# Patient Record
Sex: FEMALE | Race: BLACK OR AFRICAN AMERICAN
[De-identification: names, ages, dates, MRNs, and addresses within clinical notes are randomized per-mention and may not be internally consistent; named-entity substitution may affect disease eponyms.]

---

## 2018-05-11 ENCOUNTER — HOSPITAL ENCOUNTER (OUTPATIENT)
Dept: HOSPITAL 62 - LC | Age: 33
Discharge: HOME | End: 2018-05-11
Attending: OBSTETRICS & GYNECOLOGY
Payer: MEDICAID

## 2018-05-11 DIAGNOSIS — Z3A.34: ICD-10-CM

## 2018-05-11 DIAGNOSIS — O16.3: Primary | ICD-10-CM

## 2018-05-11 LAB
ADD MANUAL DIFF: NO
ALBUMIN SERPL-MCNC: 3.5 G/DL (ref 3.5–5)
ALP SERPL-CCNC: 104 U/L (ref 38–126)
ALT SERPL-CCNC: 21 U/L (ref 9–52)
ANION GAP SERPL CALC-SCNC: 13 MMOL/L (ref 5–19)
APPEARANCE UR: (no result)
APTT PPP: YELLOW S
AST SERPL-CCNC: 13 U/L (ref 14–36)
BASOPHILS # BLD AUTO: 0 10^3/UL (ref 0–0.2)
BASOPHILS NFR BLD AUTO: 0.4 % (ref 0–2)
BILIRUB DIRECT SERPL-MCNC: 0.2 MG/DL (ref 0–0.4)
BILIRUB SERPL-MCNC: 0.2 MG/DL (ref 0.2–1.3)
BILIRUB UR QL STRIP: NEGATIVE
BUN SERPL-MCNC: 5 MG/DL (ref 7–20)
CALCIUM: 9.7 MG/DL (ref 8.4–10.2)
CHLORIDE SERPL-SCNC: 106 MMOL/L (ref 98–107)
CO2 SERPL-SCNC: 23 MMOL/L (ref 22–30)
CREAT UR-MCNC: 67.4 MG/DL (ref 16–327)
EOSINOPHIL # BLD AUTO: 0.1 10^3/UL (ref 0–0.6)
EOSINOPHIL NFR BLD AUTO: 1.3 % (ref 0–6)
ERYTHROCYTE [DISTWIDTH] IN BLOOD BY AUTOMATED COUNT: 14.9 % (ref 11.5–14)
GLUCOSE SERPL-MCNC: 101 MG/DL (ref 75–110)
GLUCOSE UR STRIP-MCNC: NEGATIVE MG/DL
HCT VFR BLD CALC: 33.2 % (ref 36–47)
HGB BLD-MCNC: 10.9 G/DL (ref 12–15.5)
KETONES UR STRIP-MCNC: (no result) MG/DL
LDH1 SERPL-CCNC: 268 U/L (ref 313–618)
LYMPHOCYTES # BLD AUTO: 1.8 10^3/UL (ref 0.5–4.7)
LYMPHOCYTES NFR BLD AUTO: 20 % (ref 13–45)
MCH RBC QN AUTO: 26.6 PG (ref 27–33.4)
MCHC RBC AUTO-ENTMCNC: 33 G/DL (ref 32–36)
MCV RBC AUTO: 81 FL (ref 80–97)
MONOCYTES # BLD AUTO: 0.5 10^3/UL (ref 0.1–1.4)
MONOCYTES NFR BLD AUTO: 5.3 % (ref 3–13)
NEUTROPHILS # BLD AUTO: 6.5 10^3/UL (ref 1.7–8.2)
NEUTS SEG NFR BLD AUTO: 73 % (ref 42–78)
NITRITE UR QL STRIP: NEGATIVE
PH UR STRIP: 6 [PH] (ref 5–9)
PLATELET # BLD: 372 10^3/UL (ref 150–450)
POTASSIUM SERPL-SCNC: 4.2 MMOL/L (ref 3.6–5)
PROT SERPL-MCNC: 6.6 G/DL (ref 6.3–8.2)
PROT UR STRIP-MCNC: 16 MG/DL (ref ?–12)
PROT UR STRIP-MCNC: NEGATIVE MG/DL
RBC # BLD AUTO: 4.11 10^6/UL (ref 3.72–5.28)
SODIUM SERPL-SCNC: 141.5 MMOL/L (ref 137–145)
SP GR UR STRIP: 1.01
TOTAL CELLS COUNTED % (AUTO): 100 %
UR PRO/CREAT RATIO RESULT: 0.2 MG/MG (ref 0–0.2)
URATE SERPL-MCNC: 5.2 MG/DL (ref 2.5–7)
UROBILINOGEN UR-MCNC: NEGATIVE MG/DL (ref ?–2)
WBC # BLD AUTO: 8.9 10^3/UL (ref 4–10.5)

## 2018-05-11 PROCEDURE — 80053 COMPREHEN METABOLIC PANEL: CPT

## 2018-05-11 PROCEDURE — 76815 OB US LIMITED FETUS(S): CPT

## 2018-05-11 PROCEDURE — 36415 COLL VENOUS BLD VENIPUNCTURE: CPT

## 2018-05-11 PROCEDURE — 59025 FETAL NON-STRESS TEST: CPT

## 2018-05-11 PROCEDURE — 84156 ASSAY OF PROTEIN URINE: CPT

## 2018-05-11 PROCEDURE — 83615 LACTATE (LD) (LDH) ENZYME: CPT

## 2018-05-11 PROCEDURE — 81005 URINALYSIS: CPT

## 2018-05-11 PROCEDURE — 82570 ASSAY OF URINE CREATININE: CPT

## 2018-05-11 PROCEDURE — 84550 ASSAY OF BLOOD/URIC ACID: CPT

## 2018-05-11 PROCEDURE — 85025 COMPLETE CBC W/AUTO DIFF WBC: CPT

## 2018-05-11 PROCEDURE — 4A1HXCZ MONITORING OF PRODUCTS OF CONCEPTION, CARDIAC RATE, EXTERNAL APPROACH: ICD-10-PCS | Performed by: OBSTETRICS & GYNECOLOGY

## 2018-05-11 NOTE — L&D PROGRESS NOTES
=================================================================

PROGRESS NOTES

=================================================================

Datetime Report Generated by CPN: 05/11/2018 14:25

   

   

=================================================================

PROGRESS NOTE

=================================================================

   

Impression Other:  Hypertension

Plan Other:  Home with followup Monday

Vital Signs :  Reviewed

Comment:  Pt with some mild range hypertension.  will begin labetolol

   and check 24 hour urine protein.  Followup MOn.

   

=================================================================

FETUS A

=================================================================

   

FHR - Baseline:  140

Variability:  Moderate 6-25bpm

FHR Category:  Category I

:  35.0

   

=================================================================

SIGNATURE

=================================================================

   

SIGNATURE:  [id number]

Signature:  Electronically signed by Chilo Jean MD ([email or web address]) on

   5/11/2018 at 14:25  with User ID: [email or web address]

## 2018-05-11 NOTE — RADIOLOGY REPORT (SQ)
EXAM DESCRIPTION:  U/S OB LIMITED



COMPLETED DATE/TIME:  5/11/2018 11:33 am



REASON FOR STUDY:  nayana and growth- limited care



COMPARISON:  None.



TECHNIQUE:  Limited transvaginal grayscale ultrasound for evaluation of specific requested obstetrica
l parameters.



LIMITATIONS:  None.



FINDINGS:  Fetal heart rate 157.

Posterior grade 1 placenta.  Vertex presentation.

NAYANA 13.0.

Gestational age 35 weeks 1 day.



IMPRESSION:  LIMITED OBSTETRICAL ULTRASOUND WITH MEASURED PARAMETERS DELINEATED ABOVE.

Trimester of pregnancy: Third trimester - 28 weeks to delivery.



TECHNICAL DOCUMENTATION:  JOB ID:  [id number]

 2011 [employer]- All Rights Reserved



Reading location - IP/workstation name: Unknown

## 2018-06-13 ENCOUNTER — HOSPITAL ENCOUNTER (INPATIENT)
Dept: HOSPITAL 62 - LC | Age: 33
LOS: 2 days | Discharge: HOME | End: 2018-06-15
Attending: OBSTETRICS & GYNECOLOGY | Admitting: OBSTETRICS & GYNECOLOGY
Payer: MEDICAID

## 2018-06-13 DIAGNOSIS — F14.90: ICD-10-CM

## 2018-06-13 DIAGNOSIS — F20.9: ICD-10-CM

## 2018-06-13 DIAGNOSIS — O34.211: ICD-10-CM

## 2018-06-13 DIAGNOSIS — F12.90: ICD-10-CM

## 2018-06-13 DIAGNOSIS — Z3A.34: ICD-10-CM

## 2018-06-13 DIAGNOSIS — E66.01: ICD-10-CM

## 2018-06-13 DIAGNOSIS — N85.8: ICD-10-CM

## 2018-06-13 DIAGNOSIS — F31.9: ICD-10-CM

## 2018-06-13 LAB
ADD MANUAL DIFF: NO
ALBUMIN SERPL-MCNC: 3.4 G/DL (ref 3.5–5)
ALP SERPL-CCNC: 145 U/L (ref 38–126)
ALT SERPL-CCNC: 26 U/L (ref 9–52)
ANION GAP SERPL CALC-SCNC: 12 MMOL/L (ref 5–19)
APPEARANCE UR: (no result)
APTT BLD: 26.7 SEC (ref 23.5–35.8)
APTT PPP: YELLOW S
AST SERPL-CCNC: 21 U/L (ref 14–36)
BACTERIA (WET MOUNT): (no result)
BARBITURATES UR QL SCN: NEGATIVE
BASOPHILS # BLD AUTO: 0.1 10^3/UL (ref 0–0.2)
BASOPHILS NFR BLD AUTO: 0.5 % (ref 0–2)
BILIRUB DIRECT SERPL-MCNC: 0.2 MG/DL (ref 0–0.4)
BILIRUB SERPL-MCNC: 0.2 MG/DL (ref 0.2–1.3)
BILIRUB UR QL STRIP: NEGATIVE
BUN SERPL-MCNC: 18 MG/DL (ref 7–20)
CALCIUM: 10 MG/DL (ref 8.4–10.2)
CHLAM PCR: NOT DETECTED
CHLORIDE SERPL-SCNC: 111 MMOL/L (ref 98–107)
CO2 SERPL-SCNC: 19 MMOL/L (ref 22–30)
CREAT UR-MCNC: 172 MG/DL (ref 16–327)
EOSINOPHIL # BLD AUTO: 0.1 10^3/UL (ref 0–0.6)
EOSINOPHIL NFR BLD AUTO: 0.8 % (ref 0–6)
EPITHELIALS (WET MOUNT): (no result)
ERYTHROCYTE [DISTWIDTH] IN BLOOD BY AUTOMATED COUNT: 15.5 % (ref 11.5–14)
GLUCOSE SERPL-MCNC: 101 MG/DL (ref 75–110)
GLUCOSE UR STRIP-MCNC: NEGATIVE MG/DL
GON PCR: NOT DETECTED
HCT VFR BLD CALC: 35.1 % (ref 36–47)
HGB BLD-MCNC: 11.6 G/DL (ref 12–15.5)
INR PPP: 0.94
KETONES UR STRIP-MCNC: NEGATIVE MG/DL
LDH1 SERPL-CCNC: 328 U/L (ref 313–618)
LYMPHOCYTES # BLD AUTO: 2.2 10^3/UL (ref 0.5–4.7)
LYMPHOCYTES NFR BLD AUTO: 20.7 % (ref 13–45)
MCH RBC QN AUTO: 27.1 PG (ref 27–33.4)
MCHC RBC AUTO-ENTMCNC: 32.9 G/DL (ref 32–36)
MCV RBC AUTO: 83 FL (ref 80–97)
METHADONE UR QL SCN: NEGATIVE
MONOCYTES # BLD AUTO: 0.6 10^3/UL (ref 0.1–1.4)
MONOCYTES NFR BLD AUTO: 6.2 % (ref 3–13)
NEUTROPHILS # BLD AUTO: 7.5 10^3/UL (ref 1.7–8.2)
NEUTS SEG NFR BLD AUTO: 71.8 % (ref 42–78)
NITRITE UR QL STRIP: NEGATIVE
PCP UR QL SCN: NEGATIVE
PH UR STRIP: 5 [PH] (ref 5–9)
PLATELET # BLD: 336 10^3/UL (ref 150–450)
POTASSIUM SERPL-SCNC: 4.8 MMOL/L (ref 3.6–5)
PROT SERPL-MCNC: 6.7 G/DL (ref 6.3–8.2)
PROT UR STRIP-MCNC: 30 MG/DL
PROT UR STRIP-MCNC: 36.7 MG/DL (ref ?–12)
PROTHROMBIN TIME: 13.1 SEC (ref 11.4–15.4)
RBC # BLD AUTO: 4.26 10^6/UL (ref 3.72–5.28)
RBCS (WET MOUNT): (no result)
RUBELLA INTERPRETATION: POSITIVE
RUBV IGG SER-ACNC: 17.4 IU/ML
SODIUM SERPL-SCNC: 141.8 MMOL/L (ref 137–145)
SP GR UR STRIP: 1.02
T.VAGINALIS (WET MOUNT): (no result)
TOTAL CELLS COUNTED % (AUTO): 100 %
UR PRO/CREAT RATIO RESULT: 0.2 MG/MG (ref 0–0.2)
URATE SERPL-MCNC: 9.2 MG/DL (ref 2.5–7)
URINE AMPHETAMINES SCREEN: NEGATIVE
URINE BENZODIAZEPINES SCREEN: NEGATIVE
URINE COCAINE SCREEN: (no result)
URINE MARIJUANA (THC) SCREEN: (no result)
UROBILINOGEN UR-MCNC: NEGATIVE MG/DL (ref ?–2)
WBC # BLD AUTO: 10.4 10^3/UL (ref 4–10.5)
WBCS (WET MOUNT): (no result)
YEAST (WET MOUNT): (no result)

## 2018-06-13 PROCEDURE — 86803 HEPATITIS C AB TEST: CPT

## 2018-06-13 PROCEDURE — 87491 CHLMYD TRACH DNA AMP PROBE: CPT

## 2018-06-13 PROCEDURE — 80353 DRUG SCREENING COCAINE: CPT

## 2018-06-13 PROCEDURE — 86701 HIV-1ANTIBODY: CPT

## 2018-06-13 PROCEDURE — 94760 N-INVAS EAR/PLS OXIMETRY 1: CPT

## 2018-06-13 PROCEDURE — 87086 URINE CULTURE/COLONY COUNT: CPT

## 2018-06-13 PROCEDURE — 86762 RUBELLA ANTIBODY: CPT

## 2018-06-13 PROCEDURE — 86920 COMPATIBILITY TEST SPIN: CPT

## 2018-06-13 PROCEDURE — 83036 HEMOGLOBIN GLYCOSYLATED A1C: CPT

## 2018-06-13 PROCEDURE — 96372 THER/PROPH/DIAG INJ SC/IM: CPT

## 2018-06-13 PROCEDURE — 88307 TISSUE EXAM BY PATHOLOGIST: CPT

## 2018-06-13 PROCEDURE — 85025 COMPLETE CBC W/AUTO DIFF WBC: CPT

## 2018-06-13 PROCEDURE — 84156 ASSAY OF PROTEIN URINE: CPT

## 2018-06-13 PROCEDURE — 87591 N.GONORRHOEAE DNA AMP PROB: CPT

## 2018-06-13 PROCEDURE — 84443 ASSAY THYROID STIM HORMONE: CPT

## 2018-06-13 PROCEDURE — 85730 THROMBOPLASTIN TIME PARTIAL: CPT

## 2018-06-13 PROCEDURE — 83615 LACTATE (LD) (LDH) ENZYME: CPT

## 2018-06-13 PROCEDURE — 86901 BLOOD TYPING SEROLOGIC RH(D): CPT

## 2018-06-13 PROCEDURE — 86804 HEP C AB TEST CONFIRM: CPT

## 2018-06-13 PROCEDURE — 85610 PROTHROMBIN TIME: CPT

## 2018-06-13 PROCEDURE — 85027 COMPLETE CBC AUTOMATED: CPT

## 2018-06-13 PROCEDURE — 80307 DRUG TEST PRSMV CHEM ANLYZR: CPT

## 2018-06-13 PROCEDURE — 86850 RBC ANTIBODY SCREEN: CPT

## 2018-06-13 PROCEDURE — 36415 COLL VENOUS BLD VENIPUNCTURE: CPT

## 2018-06-13 PROCEDURE — 87088 URINE BACTERIA CULTURE: CPT

## 2018-06-13 PROCEDURE — G0480 DRUG TEST DEF 1-7 CLASSES: HCPCS

## 2018-06-13 PROCEDURE — 87081 CULTURE SCREEN ONLY: CPT

## 2018-06-13 PROCEDURE — 80053 COMPREHEN METABOLIC PANEL: CPT

## 2018-06-13 PROCEDURE — 81005 URINALYSIS: CPT

## 2018-06-13 PROCEDURE — 82570 ASSAY OF URINE CREATININE: CPT

## 2018-06-13 PROCEDURE — 86900 BLOOD TYPING SEROLOGIC ABO: CPT

## 2018-06-13 PROCEDURE — 87210 SMEAR WET MOUNT SALINE/INK: CPT

## 2018-06-13 PROCEDURE — 87340 HEPATITIS B SURFACE AG IA: CPT

## 2018-06-13 PROCEDURE — 90715 TDAP VACCINE 7 YRS/> IM: CPT

## 2018-06-13 PROCEDURE — 84550 ASSAY OF BLOOD/URIC ACID: CPT

## 2018-06-13 PROCEDURE — 99465 NB RESUSCITATION: CPT

## 2018-06-13 PROCEDURE — 86592 SYPHILIS TEST NON-TREP QUAL: CPT

## 2018-06-13 RX ADMIN — DOCUSATE SODIUM SCH MG: 100 CAPSULE, LIQUID FILLED ORAL at 18:58

## 2018-06-13 RX ADMIN — IBUPROFEN SCH MG: 800 TABLET, FILM COATED ORAL at 22:37

## 2018-06-13 RX ADMIN — LABETALOL HYDROCHLORIDE SCH MG: 200 TABLET, FILM COATED ORAL at 22:30

## 2018-06-13 RX ADMIN — FAMOTIDINE SCH MG: 20 TABLET, FILM COATED ORAL at 22:37

## 2018-06-13 RX ADMIN — FERROUS SULFATE TAB 325 MG (65 MG ELEMENTAL FE) SCH MG: 325 (65 FE) TAB at 18:58

## 2018-06-13 NOTE — ADMISSION PHYSICAL
=================================================================



=================================================================

Datetime Report Generated by CPN: 2018 16:24

   

   

=================================================================

CURRENT ADMISSION

=================================================================

   

Prenatal Hx Assessment:  No Prenatal Care except for one visit with

   nurse at [facility] and one at [facility]

Chief Complaint:  Uterine Contractions

Indication for Induction:  Not Applicable

Admit Impression :  Term, Intrauterine Pregnancy

Admit Plan:  Admit to Unit; Initiate Labor Protocol; Initiate 

   Protocol

   

=================================================================

ALLERGIES

=================================================================

   

Medication Allergies:  No

Medication Allergies:  No Known Allergies (2018)

Latex:  No Latex Allergies

   

=================================================================

OBSTETRICAL HISTORY

=================================================================

   

EDC:  2018 00:00

:  6

Para:  2

Term:  2

:  0

SAB:  3

IAB:  0

Ectopic:  0

Livin

Cesareans:  1

VBACs:  0

Multiple Births:  0

Gestational Diabetes:  No

Rh Sensitization:  No

Incompetent Cervix:  No

FORTINO:  No

Infertility:  No

ART Treatment:  No

Uterine Anomaly:  No

IUGR:  No

Hx Previous C/S:  No

Macrosomia:  No

Hx Loss/Stillborn:  No

PIH:  Yes

Hx  Death:  No

Placenta Previa/Abruption:  No

Depression/PP Depression:  Yes

PTL/PROM:  No

Post Partum Hemorrhage:  No

Current Pregnancy Procedures:  Ultrasound; NST

Obstetrical History Comments:  G1:  full term vaginal

   G2:  34 weeks, C/S, pre-e

   G3: current

   

=================================================================

***SEE PRENATAL RECORDS***

=================================================================

   

Alcohol:  No

Marijuana :  Yes

Cocaine:  Yes

Last Used:  18

Cocaine Comments:  Pt reports no but tested positive on UDS 18

Other Illicit Drugs:  No

Cigarettes:  Never Smoker. [id number]

   

=================================================================

MEDICAL HISTORY

=================================================================

   

Diabetes:  No

Blood Transfusion:  No

Pulmonary Disease (Asthma, TB):  No

Breast Disease:  No

Hypertension:  No

Gyn Surgery:  No

Heart Disease:  No

Hosp/Surgery:  Yes

Autoimmune Disorder:  No

Anesthetic Complications:  No

Kidney Disease:  No

Abnormal Pap Smear:  No

Neuro/Epilepsy:  No

Psychiatric Disorders:  Yes

Other Medical Diseases:  No

Hepatitis/Liver Disease:  No

Significant Family History:  No

Varicosities/Phlebitis:  No

Trauma/Violence :  No

Thyroid Dysfunction:  No

Medical History Comments:  C/SECTION- 

   

=================================================================

INFECTIOUS HISTORY

=================================================================

   

Gonorrhea:  No

Genital Herpes:  No

Chlamydia:  No

Tuberculosis:  No

Syphilis:  No

Hepatitis:  No

HIV/AIDS Exposure:  No

Rash or Viral Illness:  No

HPV:  No

   

=================================================================

PHYSICAL EXAM

=================================================================

   

General:  Normal

HEENT:  Deferred

Neurologic:  Deferred

Thyroid:  Deferred

Heart:  Normal

Lungs:  Normal

Breast:  Normal

Back:  Normal

Abdomen:  Normal

Genitourinary Exam:  Normal

Extremities:  Normal

DTRs:  Normal

Pelvic Type:  Adequate

Physical Exam Comments:  pelvis proven to 7lbs

Vital Signs:  Reviewed

Details Vital Signs:  severe range Dr. Bishop in room and managing pt.

   

=================================================================

VAGINAL EXAM

=================================================================

   

Dilatation:  5

Effacement:  70

Station:  -2

Contraction Comments:  2-3

   

=================================================================

MEMBRANES

=================================================================

   

Membranes:  Ruptured

Amniotic Fluid Color:  Clear

   

=================================================================

FETUS A

=================================================================

   

EGA:  34.0

Monitoring:  Internal Scalp Electrode

FHR- Baseline:  135

Variability:  Minimal - Undetectable to <=5bpm

Decelerations:  Early; Late; Variable

FHR Category:  Category II

FHR Comments:  pt. had cocaine and marijuana last night

Fetal Presentation:  Vertex

Admit Comment:  34yo  @ 38w5d per 26w2d sono. Pt. is B pos, with

   neg GC/Chlam on admission and all other labs pending. No prenatal

   care except for a one time consult at [facility] and a nurse visit at [facility].

   Pregnancy complicated by CHTN (on labetalol 200mg bid), morbid

   obesity (BMI 53.4  @ [facility] nurse visit), bipolar disorder,

   schizophrenia, anxiety, depression, cocaine and marijuana use

   multiple times in pregnancy including last night. Also with a Hgb

   A1c of 6.2 on admission. Hx of vaginal delivery in  and primary

    in 2016 for hypertensive crisis/FHR intolerance. Pt.

   desires to TOLAC. Presented to L_D with contractions and had

   cervical change and admitted per Dr. Bishop. Thought to be 

   at first due to LMP discrepancy. Records reviewed and PAWAN confirmed.

   Pt. now with epidural and 9cm. Dr. Bishop remains in unit and

   anesthesia aware of laboring TOLAC.

   

=================================================================

PLANS FOR LABOR AND DELIVERY

=================================================================

   

Labor and Delivery:  None

Pain Management:  Epidural

Feeding Preference:  Formula

Benefit of Breast Feed Discussed:  Yes

Circumcision:  N/A

   

=================================================================

INFORMED CONSENT

=================================================================

   

Assignment:  Zenaida Bishop MD

Signature:  Electronically signed by Ruth Lebron CNM on

   2018 at 16:22  with User ID: [email or web address]

:  Electronically signed by Ruth Lebron CNM on 2018 at

   16:22  with User ID: [email or web address]

## 2018-06-13 NOTE — NON STRESS TEST REPORT
=================================================================

Non Stress Test

=================================================================

Datetime Report Generated by CPN: 06/13/2018 11:32

   

   

=================================================================

DEMOGRAPHIC

=================================================================

   

EGA NST:  34.0

   

=================================================================

INDICATION

=================================================================

   

Indication for Study:  Ordered by Provider

   

=================================================================

VITAL SIGNS

=================================================================

   

Temperature - NST:  97.9

Pulse - NST:  90

RESP - NST:  20

NBPSYS NST:  146

NBPDIA NST:  81

   

=================================================================

MONITORING

=================================================================

   

Monitor Explained:  Monitor Explained; Test Explained; Patient

   Verbalized Understanding

Time on Monitor:  05/11/2018 12:40

   

=================================================================

NST INTERVENTIONS

=================================================================

   

NST Interventions:  PO Hydration

Physician Notified NST:  Dr Jean

BABY A:  [id number]

   

=================================================================

BABY A

=================================================================

   

Fetal Movement :  Present

Contraction Frequency :  occasional

Accelerations :  15X15

Decelerations :  None

Variability :  Moderate 6-25bpm

NST Review:  Meets Criteria for Reactive NST

NST Review and Verified By :  JESUSITA Escobar RNC

NST Results:  Reactive

   

=================================================================

NST REPORT

=================================================================

   

Report Trigger:  Send Report

## 2018-06-13 NOTE — DELIVERY SUMMARY
=================================================================

Del Sum A-C

=================================================================

Datetime Report Generated by CPN: 2018 20:28

   

   

=================================================================

DELIVERY PERSONNEL

=================================================================

   

DELIVERY PERSONNEL:  [id number]

Delivery Doctor::  Zenaida Bishop MD

Labor and Delivery Nurse::  Kayla Goldstein RN

Nursery Nurse::  Aure Fierro RN

Nursery Nurse::  Teresa Barragan RN

Scrub Tech/CNA:  Shey Martinez CNA II

   

=================================================================

MATERNAL INFORMATION

=================================================================

   

Delivery Anesthesia:  Epidural

Medications After Delivery:  Pitocin Bolus-Please Comment; Pitocin Drip

   20 Units/1000ml NSS

Maternal Complications:  Other

Complication Details:  LIMITED PNC; + COCAINE _ THC; PRE-ECLAMPSIA;

   PRIOR C/SECTION

Provider Comments:  FHR with repetitive deep variable decels.  Reviewed

   with patient AL and need to reduce anterior lip of cervix.  Pt

   inquired regarding a  section since she does not like

   cervical exams.  Reviewed with paitnet that need to deliver and

   would be much faster to deliver vaginally.  She wished to wait on

    and reviewed again with  need to proceed with

   delivery.    spoke with patient and patient agreed for

   vaginal exam and attempt at reduction of anterior lip.  Patient

   positioned and anterior lip of the cervix reduced.  She had recieved

   50mcg fentanyl apporx 45 min prior to delivery and then within 10

   minutes from delivery as she would not consent to cervical exam

   without medication.  VMI delivered in STEPHANIE presentation in one

   contraction after reducing the anterior lip of cervix.  Infant with

   spontaneous breath then to warmer for NRP and decreased respirations

   and tone.  Infant transported to NICU.  No perineal lacerations.  FF

   at U after delivery of placenta.  Mother stable upon provider

   leaving the room.

   

=================================================================

LABOR SUMMARY

=================================================================

   

EDC:  2018 00:00

No. Babies in Womb:  1

 Attempted:  Yes

Labor Anesthesia:  Epidural

   

=================================================================

LABOR INFORMATION

=================================================================

   

Reason for Induction:  Not Applicable

Onset of Labor:  2018 12:38

Complete Dilatation:  2018 17:16

Oxytocin:  N/A

Group B Beta Strep:  UNKNOWN

Antibiotics # of Doses:  1

Antibiotics Time of Last Dose:  1708

Name of Antibiotic Given:  PENICILLIN G

Steroids Given:  Partial Course; < 24 Hours before Delivery

Reason Steroids Not Administered:  Fetal Indication

   

=================================================================

MEMBRANES

=================================================================

   

Membranes Rupture Method:  Spontaneous

Rupture of Membranes:  2018 11:12

Length of Rupture (hr):  6.12

Amniotic Fluid Color:  Clear

Amniotic Fluid Amount:  Small

Amniotic Fluid Odor:  Foul

   

=================================================================

STAGES OF LABOR

=================================================================

   

Stage 1 hr:  4

Stage 1 min:  38

Stage 2 hr:  0

Stage 2 min:  3

Stage 3 hr:  0

Stage 3 min:  2

Total Time in Labor hr:  4

Total Time in Labor min:  43

   

=================================================================

VAGINAL DELIVERY

=================================================================

   

Episiotomy:  None

Laceration #1:  None

Laceration Extension #1:  N/A

Laceration Repair:  Not Applicable

Sponge Count Correct:  N/A

Sharps Count Correct:  N/A

   

=================================================================

CSECTION DELIVERY

=================================================================

   

Primary Indication:  N/A

Secondary Indication:  N/A

CSection Incidence:  N/A

Labor:  N/A

Elective:  N/A

CSection Incision:  N/A

   

=================================================================

BABY A INFORMATION

=================================================================

   

Infant Delivery Date/Time:  2018 17:19

Method of Delivery:  Vaginal

Born in Route :  No

:  N/A

Forceps:  N/A

Vacuum Extraction:  N/A

Shoulder Dystocia :  No

   

=================================================================

PRESENTATION/POSITION BABY A

=================================================================

   

Presentation:  Cephalic

Cephalic Presentation:  Vertex

Vertex Position:  Left Occipital Anterior

   

=================================================================

PLACENTA INFORMATION BABY A

=================================================================

   

Placenta Delivery Time :  2018 17:21

Placenta Method of Delivery:  Spontaneous

Placenta Status:  Delivered

   

=================================================================

APGAR SCORES BABY A

=================================================================

   

Heart Rate 1 min:  Slow, Below 100 bpm

Resp Effort 1 min:  Absent

Reflex Irritability 1 min:  No Response

Muscle Tone 1 min:  Flaccid

Color 1 min:  Blue/Pale

Resuscitation Effort 1 min:  Tactile Stimulation; PPV/NCPAP

APGAR SCORE 1 MIN:  1

Heart Rate 5 min:  >100 bpm

Resp Effort 5 min:  Slow, Irregular

Reflex Irritability 5 min:  No Response

Muscle Tone 5 min:  Flaccid

Color 5 min:  Blue/Pale

Resuscitation Effort 5 min:  PPV/NCPAP

APGAR SCORE 5 MIN:  3

Heart Rate 10 min:  >100 bpm

Resp Effort 10 min:  Slow, Irregular

Reflex Irritability 10 min:  Cough or Sneeze or Pulls Away

Muscle Tone 10 min:  Flaccid

Color 10 min:  Blue/Pale

Resuscitation Effort 10 min:  PPV/NCPAP

APGAR SCORE 10 MIN:  5

Heart Rate 15 min:  >100 bpm

Resp Effort 15 min:  Slow, Irregular

Reflex Irritability 15 min:  Cough or Sneeze or Pulls Away

Muscle Tone 15 min:  Some Flexion of Extremities

Color 15 min:  Body Pink, Extremities Blue

Resuscitation Effort 15 min:  Oxygen; PPV/NCPAP

APGAR SCORE 15 MIN:  7

   

=================================================================

INFANT INFORMATION BABY A

=================================================================

   

Gestational Age at Delivery:  34.3

Gestational Status:  Late - 34- 36.6 Weeks

Infant Outcome :  Liveborn

Infant Condition :  Critical

Infant Sex:  Male

   

=================================================================

IDENTIFICATION BABY A

=================================================================

   

Infant Verification Date/Time:  2018 17:49

ID Band Number:  [id number]

Mother's Name Verified:  Yes

Infant Medical Record Number:  [id number]

RN Verifying Infant:  J.Field, RN

Additional Verifying Personnel:  ALTARadha, CNA/US

   

=================================================================

WEIGHT/LENGTH BABY A

=================================================================

   

Infant Birthweight (gm):  3970

Infant Weight (lb):  8

Infant Weight (oz):  12

Infant Length (in):  21.00

Infant Length (cm):  53.34

   

=================================================================

CORD INFORMATION BABY A

=================================================================

   

No. Cord Vessels:  3

Nuchal Cord :  N/A

Cord Blood Taken:  Yes-For Storage (Mom's Blood type +)

   

=================================================================

ASSESSMENT BABY A

=================================================================

   

Infant Complications:  Other

Infant Complications- Other:  LIMITED PNC; 34.3 WEEK IUP

Skin to Skin:  No

   

=================================================================

BABY B INFORMATION

=================================================================

   

 :  N/A

   

=================================================================

SIGNATURES

=================================================================

   

Signature:  Electronically signed by MD WAYNE Vallecillo) on

   2018 at 19:03  with User ID: [email or web address]

## 2018-06-13 NOTE — WARNING SIGNS IN BABIES
=================================================================

VOD Warning Signs

=================================================================

Datetime Report Generated by [facility]: 06/13/2018 18:23

   

VOD#608 -Warning Signs in Babies:  Needs to be viewed.    (05/11/2018

   11:12:Kayla Goldstein RN)

## 2018-06-14 LAB
ERYTHROCYTE [DISTWIDTH] IN BLOOD BY AUTOMATED COUNT: 15.4 % (ref 11.5–14)
HCT VFR BLD CALC: 32.5 % (ref 36–47)
HCV AB SER IA-ACNC: <0.1 S/CO RATIO (ref 0–0.9)
HGB BLD-MCNC: 10.5 G/DL (ref 12–15.5)
MCH RBC QN AUTO: 26.2 PG (ref 27–33.4)
MCHC RBC AUTO-ENTMCNC: 32.2 G/DL (ref 32–36)
MCV RBC AUTO: 81 FL (ref 80–97)
PLATELET # BLD: 298 10^3/UL (ref 150–450)
RBC # BLD AUTO: 3.99 10^6/UL (ref 3.72–5.28)
WBC # BLD AUTO: 17.5 10^3/UL (ref 4–10.5)

## 2018-06-14 RX ADMIN — SENNOSIDES, DOCUSATE SODIUM SCH EACH: 50; 8.6 TABLET, FILM COATED ORAL at 11:16

## 2018-06-14 RX ADMIN — ACETAMINOPHEN AND CODEINE PHOSPHATE PRN EACH: 300; 30 TABLET ORAL at 16:32

## 2018-06-14 RX ADMIN — LABETALOL HYDROCHLORIDE SCH MG: 200 TABLET, FILM COATED ORAL at 14:52

## 2018-06-14 RX ADMIN — DOCUSATE SODIUM SCH MG: 100 CAPSULE, LIQUID FILLED ORAL at 11:17

## 2018-06-14 RX ADMIN — FAMOTIDINE SCH MG: 20 TABLET, FILM COATED ORAL at 11:17

## 2018-06-14 RX ADMIN — DOCUSATE SODIUM SCH MG: 100 CAPSULE, LIQUID FILLED ORAL at 18:06

## 2018-06-14 RX ADMIN — FERROUS SULFATE TAB 325 MG (65 MG ELEMENTAL FE) SCH MG: 325 (65 FE) TAB at 18:06

## 2018-06-14 RX ADMIN — IBUPROFEN SCH MG: 800 TABLET, FILM COATED ORAL at 21:03

## 2018-06-14 RX ADMIN — IBUPROFEN SCH MG: 800 TABLET, FILM COATED ORAL at 11:18

## 2018-06-14 RX ADMIN — LABETALOL HYDROCHLORIDE SCH MG: 200 TABLET, FILM COATED ORAL at 21:04

## 2018-06-14 RX ADMIN — FERROUS SULFATE TAB 325 MG (65 MG ELEMENTAL FE) SCH MG: 325 (65 FE) TAB at 11:16

## 2018-06-14 RX ADMIN — IBUPROFEN SCH MG: 800 TABLET, FILM COATED ORAL at 14:52

## 2018-06-14 RX ADMIN — LABETALOL HYDROCHLORIDE SCH MG: 200 TABLET, FILM COATED ORAL at 08:14

## 2018-06-14 RX ADMIN — FAMOTIDINE SCH MG: 20 TABLET, FILM COATED ORAL at 21:03

## 2018-06-14 RX ADMIN — Medication SCH CAP: at 11:16

## 2018-06-14 NOTE — PDOC PROGRESS REPORT
Subjective-OB


Progress Note for:: 18


Subjective: 





s/p  day #1





denies concerns, states lochia is stable, pain well controlled, voiding without 

difficulty. 





Physical Exam (OB)


Vital Signs: 


 Intake & Output











 06/13/18 06/14/18 06/15/18





 06:59 06:59 06:59


 


Weight  152.6 kg 














- Lochia


Lochia Amount: Small 10-25 ml


Lochia Color: Rubra/Red





- Abdomen


Description: Soft, Round


Hernia Present: Yes


Fundal Description: Firm


Fundal Height: 1/u - 2/u





Objective-Diagnostic


Laboratory: 


 





 18 07:01 





 18 12:14 





 











  18





  11:43 12:14 12:14


 


WBC   10.4 


 


RBC   4.26 


 


Hgb   11.6 L 


 


Hct   35.1 L 


 


MCV   83 


 


MCH   27.1 


 


MCHC   32.9 


 


RDW   15.5 H 


 


Plt Count   336 


 


Seg Neutrophils %   71.8 


 


Lymphocytes %   20.7 


 


Monocytes %   6.2 


 


Eosinophils %   0.8 


 


Basophils %   0.5 


 


Absolute Neutrophils   7.5 


 


Absolute Lymphocytes   2.2 


 


Absolute Monocytes   0.6 


 


Absolute Eosinophils   0.1 


 


Absolute Basophils   0.1 


 


Sodium   


 


Potassium   


 


Chloride   


 


Carbon Dioxide   


 


Anion Gap   


 


BUN   


 


Creatinine   


 


Est GFR ( Amer)   


 


Est GFR (Non-Af Amer)   


 


Glucose   


 


Uric Acid   


 


Calcium   


 


Total Bilirubin   


 


AST   


 


ALT   


 


Alkaline Phosphatase   


 


Total Protein   


 


Albumin   


 


TSH    3.88


 


Urine Color  YELLOW  


 


Urine Appearance  CLOUDY  


 


Urine pH  5.0  


 


Ur Specific Gravity  1.020  


 


Urine Protein  30 H  


 


Urine Glucose (UA)  NEGATIVE  


 


Urine Ketones  NEGATIVE  


 


Urine Blood  NEGATIVE  


 


Urine Nitrite  NEGATIVE  


 


Ur Leukocyte Esterase  SMALL H  


 


Blood Type   


 


Antibody Screen   














  18





  12:14 12:14 07:01


 


WBC    17.5 H


 


RBC    3.99


 


Hgb    10.5 L


 


Hct    32.5 L


 


MCV    81


 


MCH    26.2 L


 


MCHC    32.2


 


RDW    15.4 H


 


Plt Count    298


 


Seg Neutrophils %   


 


Lymphocytes %   


 


Monocytes %   


 


Eosinophils %   


 


Basophils %   


 


Absolute Neutrophils   


 


Absolute Lymphocytes   


 


Absolute Monocytes   


 


Absolute Eosinophils   


 


Absolute Basophils   


 


Sodium   141.8 


 


Potassium   4.8 


 


Chloride   111 H 


 


Carbon Dioxide   19 L 


 


Anion Gap   12 


 


BUN   18 


 


Creatinine   1.19 


 


Est GFR ( Amer)   > 60 


 


Est GFR (Non-Af Amer)   52 L 


 


Glucose   101 


 


Uric Acid   9.2 H 


 


Calcium   10.0 


 


Total Bilirubin   0.2 


 


AST   21 


 


ALT   26 


 


Alkaline Phosphatase   145 H 


 


Total Protein   6.7 


 


Albumin   3.4 L 


 


TSH   


 


Urine Color   


 


Urine Appearance   


 


Urine pH   


 


Ur Specific Gravity   


 


Urine Protein   


 


Urine Glucose (UA)   


 


Urine Ketones   


 


Urine Blood   


 


Urine Nitrite   


 


Ur Leukocyte Esterase   


 


Blood Type  B POSITIVE  


 


Antibody Screen  NEGATIVE  














Assessment and Plan(PN)





- Assessment and Plan


(1) Cannabis abuse


Is this a current diagnosis for this admission?: Yes   


Plan: 


d/c planner








(2) Chronic hypertension


Is this a current diagnosis for this admission?: Yes   


Plan: 


monitor bp








(3) Cocaine abuse affecting pregnancy


Qualifiers: 


   Trimester: third trimester   Qualified Code(s): O99.323 - Drug use 

complicating pregnancy, third trimester; F14.10 - Cocaine abuse, uncomplicated; 

F14.10 - Cocaine abuse, uncomplicated; F14.10 - Cocaine abuse, uncomplicated   


Is this a current diagnosis for this admission?: Yes   


Plan: 


d/c planner








(4) History of  delivery


Is this a current diagnosis for this admission?: Yes   


Plan: 


routine post op care








(5) Limited prenatal care


Qualifiers: 


   Trimester: third trimester   Qualified Code(s): O09.33 - Supervision of 

pregnancy with insufficient  care, third trimester   


Is this a current diagnosis for this admission?: Yes   


Plan: 


routine pp care








(6) Vaginal birth after , delivered, current hospitalization


Is this a current diagnosis for this admission?: Yes   


Plan: 


routine pp care








- Time Spent with Patient


Time with patient: Less than 15 minutes


Critical Time spent with patient: Less than 15 minutes


Medications reviewed and adjusted accordingly: Yes





- Disposition


Anticipated Discharge: Home

## 2018-06-15 VITALS — DIASTOLIC BLOOD PRESSURE: 79 MMHG | SYSTOLIC BLOOD PRESSURE: 126 MMHG

## 2018-06-15 RX ADMIN — SENNOSIDES, DOCUSATE SODIUM SCH EACH: 50; 8.6 TABLET, FILM COATED ORAL at 09:15

## 2018-06-15 RX ADMIN — IBUPROFEN SCH MG: 800 TABLET, FILM COATED ORAL at 13:10

## 2018-06-15 RX ADMIN — ACETAMINOPHEN AND CODEINE PHOSPHATE PRN EACH: 300; 30 TABLET ORAL at 04:56

## 2018-06-15 RX ADMIN — LABETALOL HYDROCHLORIDE SCH MG: 200 TABLET, FILM COATED ORAL at 06:22

## 2018-06-15 RX ADMIN — DOCUSATE SODIUM SCH MG: 100 CAPSULE, LIQUID FILLED ORAL at 09:15

## 2018-06-15 RX ADMIN — IBUPROFEN SCH MG: 800 TABLET, FILM COATED ORAL at 06:21

## 2018-06-15 RX ADMIN — Medication SCH CAP: at 09:15

## 2018-06-15 RX ADMIN — ACETAMINOPHEN AND CODEINE PHOSPHATE PRN EACH: 300; 30 TABLET ORAL at 09:23

## 2018-06-15 RX ADMIN — FERROUS SULFATE TAB 325 MG (65 MG ELEMENTAL FE) SCH MG: 325 (65 FE) TAB at 09:15

## 2018-06-15 RX ADMIN — FAMOTIDINE SCH MG: 20 TABLET, FILM COATED ORAL at 09:15

## 2018-06-15 RX ADMIN — LABETALOL HYDROCHLORIDE SCH MG: 200 TABLET, FILM COATED ORAL at 13:10

## 2018-06-15 NOTE — PDOC DISCHARGE SUMMARY
Final Diagnosis


Discharge Date: 06/15/18





- Final Diagnosis


(1) Vaginal birth after , delivered, current hospitalization


Is this a current diagnosis for this admission?: Yes   





(2) History of  delivery


Is this a current diagnosis for this admission?: Yes   





(3) Obesities, morbid


Is this a current diagnosis for this admission?: Yes   





(4) Chronic hypertension


Is this a current diagnosis for this admission?: Yes   





(5) Limited prenatal care


Is this a current diagnosis for this admission?: Yes   





Discharge Data





- Discharge Medication


Prescriptions: 


Nifedipine [Procardia XL 30 mg Tablet] 30 mg PO QHS #30 tab.er.24


Labetalol HCl [Normodyne 200 mg Tablet] 200 mg PO Q8 #90 tablet


Home Medications: 








Prenatal Vit Calc,Iron,Folic [Prenatal Vitamins] 1 each PO DAILY #30 tablet  


Labetalol HCl [Normodyne 200 mg Tablet] 200 mg PO Q8 #90 tablet 06/15/18 


Nifedipine [Procardia XL 30 mg Tablet] 30 mg PO QHS #30 tab.er.24 06/15/18 








Gestational Age: 34.3


Reason(s) for Admission: PROM, Fetal Status - Unknow GBS, Cocaine and TCH 

ingestion prior to admission


Prenatal Procedures: Ultrasound


Intrapartum Procedure(s): Spontaneous Vaginal Delivery





- Diagnosis Test


Laboratory: 


 











Temp Pulse Resp BP Pulse Ox


 


 98.1 F   81   18   126/79 H  94 


 


 06/15/18 08:00  06/15/18 08:00  06/15/18 08:00  06/15/18 08:00  06/15/18 08:00








 











  18





  11:43 12:14 07:01


 


RBC   4.26  3.99


 


Hgb   11.6 L  10.5 L


 


Hct   35.1 L  32.5 L


 


Urine Opiates Screen  NEGATIVE  














- Discharge information/Instructions


Discharge Activity: Activity As Tolerated, No Lifting Over 10 Pounds, No Lifting

/Push/Pulling, Pelvic Rest


Discharge Diet: As Tolerated, Regular


Disposition: HOME, SELF-CARE


Follow up with: [facility]


in: 1, Weeks

## 2018-06-15 NOTE — PDOC PROGRESS REPORT
Subjective-OB


Progress Note for:: 06/15/18


Subjective: 





Asleep on rounds, asking questions, bottle feeding, baby in NICU, ready to go 

home, eating well





Physical Exam (OB)


Vital Signs: 


 











Temp Pulse Resp BP Pulse Ox


 


 98.1 F   81   18   126/79 H  94 


 


 06/15/18 08:00  06/15/18 08:00  06/15/18 08:00  06/15/18 08:00  06/15/18 08:00








 Intake & Output











 06/14/18 06/15/18 06/16/18





 06:59 06:59 06:59


 


Intake Total  350 


 


Balance  350 


 


Weight 152.6 kg  














- PIH/Pre-Eclampsia


DTR's: 1 +


Clonus: Negative


Headache: Present


Epigastric Pain: No


Visual Changes: No





- Lochia


Lochia Amount: Moderate 25-50 ml


Lochia Color: Rubra/Red





- Abdomen


Description: Soft, Round


Hernia Present: No


Fundal Description: Firm, Midline


Fundal Height: u/u - u/2





Objective-Diagnostic


Laboratory: 


 





 18 07:01 





 18 12:14 











Assessment and Plan(PN)





- Assessment and Plan


(1) Vaginal birth after , delivered, current hospitalization


Is this a current diagnosis for this admission?: Yes   





(2) History of  delivery


Is this a current diagnosis for this admission?: Yes   








(4) Chronic hypertension


Is this a current diagnosis for this admission?: Yes   





(5) Limited prenatal care


Qualifiers: 


   Trimester: third trimester   Qualified Code(s): O09.33 - Supervision of 

pregnancy with insufficient  care, third trimester   


Is this a current diagnosis for this admission?: Yes   





- Time Spent with Patient


Time with patient: Less than 15 minutes


Smoking Education Provided: Over 3 minutes


Medications reviewed and adjusted accordingly: Yes





- Disposition


Anticipated Discharge: Home


Within: Other - home today, discussed not smoking around baby, no cocaine, 

states she is not addicted, discussed birth control, ? BTL, importance of 

taking BP medicine and results of hypertension, unsure when baby is going home